# Patient Record
Sex: MALE | Race: WHITE | NOT HISPANIC OR LATINO | Employment: FULL TIME | URBAN - NONMETROPOLITAN AREA
[De-identification: names, ages, dates, MRNs, and addresses within clinical notes are randomized per-mention and may not be internally consistent; named-entity substitution may affect disease eponyms.]

---

## 2020-09-17 ENCOUNTER — OCCUPATIONAL MEDICINE (OUTPATIENT)
Dept: URGENT CARE | Facility: PHYSICIAN GROUP | Age: 55
End: 2020-09-17
Payer: COMMERCIAL

## 2020-09-17 ENCOUNTER — HOSPITAL ENCOUNTER (OUTPATIENT)
Facility: MEDICAL CENTER | Age: 55
End: 2020-09-17
Attending: FAMILY MEDICINE

## 2020-09-17 VITALS
HEIGHT: 67 IN | SYSTOLIC BLOOD PRESSURE: 150 MMHG | HEART RATE: 85 BPM | TEMPERATURE: 99.1 F | BODY MASS INDEX: 32.68 KG/M2 | OXYGEN SATURATION: 96 % | WEIGHT: 208.2 LBS | RESPIRATION RATE: 16 BRPM | DIASTOLIC BLOOD PRESSURE: 92 MMHG

## 2020-09-17 DIAGNOSIS — S80.211A ABRASION OF RIGHT KNEE, INITIAL ENCOUNTER: ICD-10-CM

## 2020-09-17 DIAGNOSIS — L02.91 ABSCESS: ICD-10-CM

## 2020-09-17 PROCEDURE — 87077 CULTURE AEROBIC IDENTIFY: CPT

## 2020-09-17 PROCEDURE — 10061 I&D ABSCESS COMP/MULTIPLE: CPT | Performed by: FAMILY MEDICINE

## 2020-09-17 PROCEDURE — 87186 SC STD MICRODIL/AGAR DIL: CPT

## 2020-09-17 PROCEDURE — 87070 CULTURE OTHR SPECIMN AEROBIC: CPT

## 2020-09-17 RX ORDER — AMOXICILLIN AND CLAVULANATE POTASSIUM 875; 125 MG/1; MG/1
1 TABLET, FILM COATED ORAL 2 TIMES DAILY
Qty: 10 TAB | Refills: 0 | Status: SHIPPED | OUTPATIENT
Start: 2020-09-17 | End: 2020-09-22

## 2020-09-17 RX ORDER — SULFAMETHOXAZOLE AND TRIMETHOPRIM 800; 160 MG/1; MG/1
1 TABLET ORAL EVERY 12 HOURS
Qty: 10 TAB | Refills: 0 | Status: SHIPPED | OUTPATIENT
Start: 2020-09-17 | End: 2020-09-22 | Stop reason: SDUPTHER

## 2020-09-17 ASSESSMENT — ENCOUNTER SYMPTOMS
BRUISES/BLEEDS EASILY: 0
FOCAL WEAKNESS: 0

## 2020-09-17 NOTE — PROGRESS NOTES
"Subjective:      Lucian Carmona is a 55 y.o. male who presents with Work-Related Injury ((R) knee, pt states a little pain, red, small lump)      DOI 9/2/2020  Abrasion to right medial knee due to fall while at work.  He has had subsequent expanding redness and warmth to the area.  Subjective fevers.  No red streaking.  No drainage.  Knee is moderately painful and stiff.  No prior injury or surgery.  No second job or outside activity contributing.  No other aggravating or alleviating factors.     HPI    Review of Systems   Skin: Negative for itching and rash.   Neurological: Negative for focal weakness.   Endo/Heme/Allergies: Does not bruise/bleed easily.          Objective:     /92   Pulse 85   Temp 37.3 °C (99.1 °F) (Temporal)   Resp 16   Ht 1.702 m (5' 7\")   Wt 94.4 kg (208 lb 3.2 oz)   SpO2 96%   BMI 32.61 kg/m²      Physical Exam    Right knee: Red tender swollen and fluctuant region distal medial aspect.  Expanding redness with a diameter of 15 cm.  No lymphangitis or proximal lymphadenopathy.  No point bony tenderness or deformity.    .  Procedure: Incision and Drainage  -Risks, benefits, and alternatives discussed. Risks include infection, bleeding, nerve damage, and poor cosmetic outcome  -Clean technique with sterile instruments  -Local anesthesia with 2% lidocaine with epinephrine  -Incision with #11 blade into fluctuant area with purulent material expressed  -Culture obtained and packaged for lab  -Cavity probed and any loculations bluntly taken down with hemostat  -Irrigated copiously with sterile saline  -Packed with 1/2\" gauze  -Minimal bleeding with good hemostasis achieved  -The patient tolerated the procedure well      Assessment/Plan:        1. Abscess    - CULTURE WOUND W/O GRAM STAIN; Future  - amoxicillin-clavulanate (AUGMENTIN) 875-125 MG Tab; Take 1 Tab by mouth 2 times a day for 5 days.  Dispense: 10 Tab; Refill: 0  - sulfamethoxazole-trimethoprim (BACTRIM DS) 800-160 " MG tablet; Take 1 Tab by mouth every 12 hours for 5 days.  Dispense: 10 Tab; Refill: 0    2. Abrasion of right knee, initial encounter    Differential diagnosis, natural history, supportive care, and indications for immediate follow-up discussed at length.     Wound check in 48 hours.  Work restrictions on [address].  Keep incision site clean and dry at work site.  I will follow up wound culture.

## 2020-09-17 NOTE — LETTER
"EMPLOYEE’S CLAIM FOR COMPENSATION/ REPORT OF INITIAL TREATMENT  FORM C-4    EMPLOYEE’S CLAIM - PROVIDE ALL INFORMATION REQUESTED   First Name  Lucian Last Name  Mathew Birthdate                    1965                Sex  male Claim Number   Home Address  [address]  Age  55 y.o. Height  1.702 m (5' 7\") Weight  94.4 kg (208 lb 3.2 oz) [facility]     [facility] Zip  [address] Telephone  [phone] (home)    Mailing Address  [address]  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 (Be specific an answer in detail. 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  N/A Witnesses to the Accident  None      Nature of Injury or Occupational Disease  Workers' Compensation  Part(s) of Body Injured or Affected  Knee (R), N/A, N/A    I certify that the above is true and correct to the best of my knowledge and that I have provided this information in order to obtain the benefits of [place] or [place]).  I hereby authorize any physician, chiropractor, surgeon, practitioner, or other person, any hospital, including [facility], any medical service organization, any insurance company, or other institution or organization to release to each other, any medical or other information, including benefits paid or payable, pertinent to this injury or disease, except information relative to diagnosis, treatment and/or counseling for AIDS, psychological conditions, alcohol or controlled substances, for which I must give specific authorization.  A Photostat of this authorization shall be as valid as the original.     Date   Place   Employee’s Signature   THIS REPORT MUST BE COMPLETED AND MAILED WITHIN 3 WORKING DAYS OF TREATMENT   Place  [facility]  Name of Facility  [place]   Date  9/17/2020 Diagnosis  (L02.91) Abscess  (S80.211A) Abrasion of right knee, initial encounter Is there evidence the injured employee was under the              influence of alcohol and/or another controlled substance at the time of accident?   Hour  12:34 PM Description of Injury or Disease  Diagnoses of Abscess and Abrasion of right knee, initial encounter were pertinent to this visit. No   Treatment  Incision and drainage of abscess.  Antibiotics.  Have you advised the patient to remain off work five days or     more? No   X-Ray Findings    Comments:NA   If Yes   From Date  To Date      From information given by the employee, together with medical evidence, can you directly connect this injury or occupational disease as job incurred?    Comments:Indeterminate.  Abscess possibly initiated by infected abrasion. If No Full Duty    No Modified Duty  Yes   Is additional medical care by a physician indicated?  Yes If Modified Duty, Specify any Limitations / Restrictions  Standing walking limited to 2 hours  Lifting and carrying weight limit 10 pounds  Keep incision site clean dry  "and covered   Do you know of any previous injury or disease contributing to this condition or occupational disease?                            No   Date  9/17/2020 Print Doctor’s Name   Thomas Jennings M.D. I certify the employer’s copy of  this form was mailed on:   Address  [address] Insurer’s Use Only     [facility] [facility] Zip  [address]    Provider’s Tax ID Number  [id number] Telephone  Dept: [phone]      jocelyne-THOMAS Matthews M.D.  Signature:     Degree          ORIGINAL-TREATING PHYSICIAN OR CHIROPRACTOR    PAGE 2-INSURER/TPA    PAGE 3-EMPLOYER    PAGE 4-EMPLOYEE        Form C-4 (rev.10/07)          BRIEF DESCRIPTION OF RIGHTS AND BENEFITS  (Pursuant to [place] 616C.050)    Notice of Injury or Occupational Disease (Incident Report Form C-1): If an injury or occupational disease (OD) arises out of and in the course of employment, you must provide written notice to your employer as soon as practicable, but no later than 7 days after the accident or OD. Your employer shall maintain a sufficient supply of the required forms.     Claim for Compensation (Form C-4): If medical treatment is sought, the form C-4 is available at the place of initial treatment. A completed \"Claim for Compensation\" (Form C-4) must be filed within 90 days after an accident or OD. The treating physician or chiropractor must, within 3 working days after treatment, complete and mail to the employer, the employer's insurer and third-party , the Claim for Compensation.     Medical Treatment: If you require medical treatment for your on-the-job injury or OD, you may be required to select a physician or chiropractor from a list provided by your workers’ compensation insurer, if it has contracted with an Organization for Managed Care (MCO) or Preferred Provider Organization (PPO) or providers of health care. If your employer has not entered into a contract with an MCO or PPO, you may select a physician or chiropractor " from the Panel of Physicians and Chiropractors. Any medical costs related to your industrial injury or OD will be paid by your insurer.     Temporary Total Disability (TTD): If your doctor has certified that you are unable to work for a period of at least 5 consecutive days, or 5 cumulative days in a 20-day period, or places restrictions on you that your employer does not accommodate, you may be entitled to TTD compensation.     Temporary Partial Disability (TPD): If the wage you receive upon reemployment is less than the compensation for TTD to which you are entitled, the insurer may be required to pay you TPD compensation to make up the difference. TPD can only be paid for a maximum of 24 months.     Permanent Partial Disability (PPD): When your medical condition is stable and there is an indication of a PPD as a result of your injury or OD, within 30 days, your insurer must arrange for an evaluation by a rating physician or chiropractor to determine the degree of your PPD. The amount of your PPD award depends on the date of injury, the results of the PPD evaluation and your age and wage.     Permanent Total Disability (PTD): If you are medically certified by a treating physician or chiropractor as permanently and totally disabled and have been granted a PTD status by your insurer, you are entitled to receive monthly benefits not to exceed 66 2/3% of your average monthly wage. The amount of your PTD payments is subject to reduction if you previously received a PPD award.     Vocational Rehabilitation Services: You may be eligible for vocational rehabilitation services if you are unable to return to the job due to a permanent physical impairment or permanent restrictions as a result of your injury or occupational disease.     Transportation and Per Justine Reimbursement: You may be eligible for travel expenses and per justine associated with medical treatment.     Reopening: You may be able to reopen your claim if your  condition worsens after claim closure.     Appeal Process: If you disagree with a written determination issued by the insurer or the insurer does not respond to your request, you may appeal to the Department of Administration, , by following the instructions contained in your determination letter. You must appeal the determination within 70 days from the date of the determination letter at [address], [address], [place], [place] [address], or [address] [facility], [address], [place], [place] [address]. If you disagree with the  decision, you may appeal to the Department of Administration, . You must file your appeal within 30 days from the date of the  decision letter at [address], [address], [place], [place] [address], or [address] [facility] [facility] [address], [place], [place] [address]. If you disagree with a decision of an , you may file a petition for judicial review with the District Court. You must do so within 30 days of the Appeal Officer’s decision. You may be represented by an  at your own expense or you may contact the [facility] for possible representation.     [place]  for Injured Workers (NAIW): If you disagree with a  decision, you may request that NAIW represent you without charge at an  Hearing. For information regarding denial of benefits, you may contact the [facility] at: [address], [address], [place] [address], [phone], or [address] [facility] [facility] [address], [place] [address], [phone]     To File a Complaint with the Division: If you wish to file a complaint with the  of the Division of Industrial Relations (DIR), please contact the Workers’ Compensation Section, [address] [facility] [facility] [address], [place], [place] [address], telephone [phone], or [address] [facility] [address], [place], [place] [address], telephone [phone].       For assistance with Workers’ Compensation Issues: You may contact the Office of the Governor Consumer Health Assistance, [address] [place] [address], [facility] [address], [place] [address], Toll Free 1-321.119.4502, Web site: http://[email or web address] access [email or web address]., E-mail [email or web address]              __________________________________________________________________                              ___________________         Employee Name / Signature                                                                                                                     Date                                                                                                                                                                                       D-2 (rev. 01/20)

## 2020-09-17 NOTE — LETTER
[address] [place] [address]  Phone:  [phone] - Fax:  [phone]   [facility] Progress Report and Disability Certification  Date of Service: 2020   No Show:  No  Date / Time of Next Visit: 2020   Claim Information   Patient Name: Lucian Carmona  Claim Number:     Employer:   [employer] Date of Injury: 2020     Insurer / TPA: Debra Zamora [facility]  ID / SSN:     Occupation: Farm Labor  Diagnosis: Diagnoses of Abscess and Abrasion of right knee, initial encounter were pertinent to this visit.    Medical Information   Related to Industrial Injury?   Comments:Indeterminate.  Abscess possibly initiated by infected abrasion.    Subjective Complaints:  DOI 2020  Abrasion to right medial knee due to fall while at work.  He has had subsequent expanding redness and warmth to the area.  Subjective fevers.  No red streaking.  No drainage.  Knee is moderately painful and stiff.  No prior injury or surgery.  No second job or outside activity contributing.  No other aggravating or alleviating factors.   Objective Findings: Right knee: Red tender swollen and fluctuant region distal medial aspect.  Expanding redness with a diameter of 15 cm.  No lymphangitis or proximal lymphadenopathy.  No point bony tenderness or deformity.   Pre-Existing Condition(s):     Assessment:   Initial Visit    Status: Additional Care Required  Permanent Disability:No    Plan:   Comments:Incision and drainage of abscess.  Antibiotics    Diagnostics:   Comments:culture pending    Comments:       Disability Information   Status: Released to Restricted Duty    From:  2020  Through: 2020 Restrictions are: Temporary   Physical Restrictions   Sitting:    Standing:  < or = to 2 hrs/day Stooping:    Bending:      Squattin hrs/day Walking:  < or = to 2 hrs/day Climbing:    Pushing:      Pulling:    Other:    Reaching Above Shoulder (L):    Reaching Above Shoulder (R):       Reaching Below Shoulder (L):    Reaching Below Shoulder (R):      Not to exceed Weight Limits   Carrying(hrs):   Weight Limit(lb): < or = to 10 pounds Lifting(hrs):   Weight  Limit(lb): < or = to 10 pounds   Comments: Keep incision site clean dry and covered while at work.    Repetitive Actions   Hands: i.e. Fine Manipulations from Grasping:     Feet: i.e. Operating Foot Controls:     Driving / Operate Machinery:     Provider Name:   Thomas Jennings M.D. Physician Signature:  Physician Name:     Clinic Name / Location: [address] [address] Clinic Phone Number: Dept: [phone]   Appointment Time: 12:10 Pm Visit Start Time: 12:34 PM   Check-In Time:  12:26 Pm Visit Discharge Time:  1:41pm   Original-Treating Physician or Chiropractor    Page 2-Insurer/TPA    Page 3-Employer    Page 4-Employee

## 2020-09-19 ENCOUNTER — OCCUPATIONAL MEDICINE (OUTPATIENT)
Dept: URGENT CARE | Facility: PHYSICIAN GROUP | Age: 55
End: 2020-09-19
Payer: COMMERCIAL

## 2020-09-19 VITALS
WEIGHT: 205.4 LBS | OXYGEN SATURATION: 95 % | RESPIRATION RATE: 16 BRPM | HEIGHT: 66 IN | TEMPERATURE: 99.4 F | SYSTOLIC BLOOD PRESSURE: 142 MMHG | BODY MASS INDEX: 33.01 KG/M2 | HEART RATE: 87 BPM | DIASTOLIC BLOOD PRESSURE: 98 MMHG

## 2020-09-19 DIAGNOSIS — S80.211A ABRASION OF RIGHT KNEE, INITIAL ENCOUNTER: ICD-10-CM

## 2020-09-19 DIAGNOSIS — L02.91 ABSCESS: ICD-10-CM

## 2020-09-19 LAB
BACTERIA WND AEROBE CULT: ABNORMAL
BACTERIA WND AEROBE CULT: ABNORMAL
SIGNIFICANT IND 70042: ABNORMAL
SITE SITE: ABNORMAL
SOURCE SOURCE: ABNORMAL

## 2020-09-19 PROCEDURE — 99024 POSTOP FOLLOW-UP VISIT: CPT | Mod: 29 | Performed by: NURSE PRACTITIONER

## 2020-09-19 NOTE — PROGRESS NOTES
"  Chief Complaint   Patient presents with   • Work-Related Injury     FV. Pt states that he feels better.        HISTORY OF PRESENT ILLNESS: Patient is a 55 y.o. male who presents to urgent care today with a work comp follow up. 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  He developed an abrasion and a subsequent abscess to his right knee.  He was seen in urgent care 2 days ago, and I indeed was performed, patient was placed on both Augmentin and Bactrim.  Today he reports significant improvement.  He denies any fever, chills, malaise.  He has been tolerating work restrictions well.  Preliminary culture report notes staph.      PMH: No pertinent past medical history to this problem  MEDS: Medications were reviewed in Epic  ALLERGIES: Allergies were reviewed in Epic  FH: No pertinent family history to this problem      ROS:  Review of Systems   Constitutional: Negative for fever, chills, weight loss, malaise, and fatigue.   HENT: Negative for ear pain, nosebleeds, congestion, sore throat and neck pain.    Eyes: Negative for vision changes.   Neuro: Negative for headache, sensory changes, weakness, seizure, LOC.   Cardiovascular: Negative for chest pain, palpitations, orthopnea and leg swelling.   Respiratory: Negative for cough, sputum production, shortness of breath and wheezing.   Gastrointestinal: Negative for abdominal pain, nausea, vomiting or diarrhea.   Genitourinary: Negative for dysuria, urgency and frequency.  Musculoskeletal: Negative for falls, neck pain, back pain, joint pain, myalgias.   Skin: Positive for abrasion and abscess to right knee.  Negative for rash, diaphoresis.     Exam:  /98   Pulse 87   Temp 37.4 °C (99.4 °F) (Temporal)   Resp 16   Ht 1.676 m (5' 6\")   Wt 93.2 kg (205 lb 6.4 oz)   SpO2 95%   General: well-nourished, well-developed male in NAD  Head: normocephalic, atraumatic  Eyes: PERRLA, no conjunctival injection, acuity grossly " intact, lids normal.  Ears: normal shape and symmetry, no tenderness, no discharge. External canals are without any significant edema or erythema. Tympanic membranes are without any inflammation, no effusion. Gross auditory acuity is intact.  Nose: symmetrical without tenderness, no discharge.  Mouth/Throat: reasonable hygiene, no erythema, exudates or tonsillar enlargement.  Neck: no masses, range of motion within normal limits, no tracheal deviation. No obvious thyroid enlargement.   Lymph: no cervical adenopathy. No supraclavicular adenopathy.   Neuro: alert and oriented. Cranial nerves 1-12 grossly intact. No sensory deficit.   Cardiovascular: regular rate and rhythm. No edema.  Pulmonary: no distress. Chest is symmetrical with respiration, no wheezes, crackles, or rhonchi.   Musculoskeletal: no clubbing, appropriate muscle tone.  Right knee: There is erythema, swelling, and tenderness to medial aspect with drained abscess and packing present.  Skin: warm, no clubbing, no cyanosis, no rashes. Right knee: There is erythema, swelling, and tenderness to medial aspect. Central is an incision with packing in place, draining serosanguinous fluid.  No lymphangitis.  No bony tenderness or deformity.  Psych: appropriate mood, affect, judgement.       Procedure: Repack of abscess  -Clean procedure  -Packing removed  -Irrigated copiously with NS  -Repacked with gauze  -Dressing applied  -Minimal bleeding with good hemostasis achieved  -The patient tolerated the procedure well      Assessment/Plan:  1. Abscess     2. Abrasion of right knee, initial encounter         Continue abx therapy, packing changed today, wound care discussed, work restrictions, RTC in 48 hours, motrin/tylenol for pain.   Supportive care, differential diagnoses, and indications for immediate follow-up discussed with patient.   Pathogenesis of diagnosis discussed including typical length and natural progression.   Instructed to return to clinic or  nearest emergency department sooner for any change in condition, further concerns, or worsening of symptoms.  Patient states understanding of the plan of care and discharge instructions.          Please note that this dictation was created using voice recognition software. I have made every reasonable attempt to correct obvious errors, but I expect that there are errors of grammar and possibly content that I did not discover before finalizing the note.      GEGE Nunez.

## 2020-09-19 NOTE — LETTER
[facility]  [address] [place] [address] [place] [address]  Phone:  [phone] - Fax:  [phone]   [facility] Progress Report and Disability Certification  Date of Service: 9/19/2020   No Show:  No  Date / Time of Next Visit: 9/21/2020   Claim Information   Patient Name: Lucian Carmona  Claim Number:     Employer:   [employer]  Date of Injury: 9/2/2020     Insurer / TPA: Debra Zamora [facility]  ID / SSN:     Occupation: Farm Labor  Diagnosis: Diagnoses of Abscess and Abrasion of right knee, initial encounter were pertinent to this visit.    Medical Information   Related to Industrial Injury? 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  He developed an abrasion and a subsequent abscess to his right knee.  He was seen in urgent care 2 days ago, and I indeed was performed, patient was placed on both Augmentin and Bactrim.  Today he reports significant improvement.  He denies any fever, chills, malaise.  He has been tolerating work restrictions well.  Preliminary culture report notes staph.   Objective Findings: A/Ox4. NAD. Right knee: There is erythema, swelling, and tenderness to medial aspect. Central is an incision with packing in place, draining serosanguinous fluid.  No lymphangitis.  No bony tenderness or deformity.   Pre-Existing Condition(s): Denies   Assessment:   Condition Improved    Status: Additional Care Required  Permanent Disability:No    Plan: Medication  Comments:Continue abx therapy, packing changed today, wound care discussed, work restrictions, RTC in 48 hours, motrin/tylenol for pain.     Diagnostics:   Comments:N/A    Comments:       Disability Information   Status: Released to Restricted Duty    From:  9/19/2020  Through: 9/21/2020 Restrictions are: Temporary   Physical Restrictions   Sitting:    Standing:  < or = to 2 hrs/day Stooping:    Bending:         Squatting:    Walking:  < or = to 2 hrs/day Climbin hrs/day Pushing:      Pulling:    Other:    Reaching Above Shoulder (L):   Reaching Above Shoulder (R):       Reaching Below Shoulder (L):    Reaching Below Shoulder (R):      Not to exceed Weight Limits   Carrying(hrs):   Weight Limit(lb): < or = to 10 pounds Lifting(hrs):   Weight  Limit(lb): < or = to 10 pounds   Comments:      Repetitive Actions   Hands: i.e. Fine Manipulations from Grasping:     Feet: i.e. Operating Foot Controls:     Driving / Operate Machinery:     Provider Name:   ED Nunez Physician Signature:  Physician Name:     Clinic Name / Location: [address] [address] Clinic Phone Number: Dept: [phone]   Appointment Time: 9:30 Am Visit Start Time: 9:17 AM   Check-In Time:  9:00 Am Visit Discharge Time:  10:06AM   Original-Treating Physician or Chiropractor    Page 2-Insurer/TPA    Page 3-Employer    Page 4-Employee

## 2020-09-22 ENCOUNTER — OCCUPATIONAL MEDICINE (OUTPATIENT)
Dept: URGENT CARE | Facility: PHYSICIAN GROUP | Age: 55
End: 2020-09-22
Payer: COMMERCIAL

## 2020-09-22 VITALS
HEIGHT: 66 IN | TEMPERATURE: 98 F | HEART RATE: 86 BPM | SYSTOLIC BLOOD PRESSURE: 170 MMHG | DIASTOLIC BLOOD PRESSURE: 100 MMHG | OXYGEN SATURATION: 95 % | WEIGHT: 208 LBS | BODY MASS INDEX: 33.43 KG/M2 | RESPIRATION RATE: 16 BRPM

## 2020-09-22 DIAGNOSIS — L02.91 ABSCESS: ICD-10-CM

## 2020-09-22 DIAGNOSIS — S80.211D ABRASION OF RIGHT KNEE, SUBSEQUENT ENCOUNTER: ICD-10-CM

## 2020-09-22 PROCEDURE — 99024 POSTOP FOLLOW-UP VISIT: CPT | Mod: 29 | Performed by: PHYSICIAN ASSISTANT

## 2020-09-22 RX ORDER — SULFAMETHOXAZOLE AND TRIMETHOPRIM 800; 160 MG/1; MG/1
1 TABLET ORAL EVERY 12 HOURS
Qty: 14 TAB | Refills: 0 | Status: SHIPPED | OUTPATIENT
Start: 2020-09-22 | End: 2020-09-29

## 2020-09-22 NOTE — PROGRESS NOTES
"Chief Complaint   Patient presents with   • Knee Injury     WC Fv, RT knee, Pt states he is improving.        HISTORY OF PRESENT ILLNESS: Patient is a 55 y.o. male who presents today for the following:    DOI: 9/2/2020, third evaluation  SKY: Fell while at work, causing an abrasion to the right knee which subsequently became infected.  I&D was performed and patient was put on Augmentin and Bactrim.  Patient stopped taking antibiotics after 2 days, indicating he was told to do so.  Patient does continue to have some soreness and discomfort with ambulation.  He denies fever.    Allergies:Patient has no known allergies.    PMH: No pertinent past medical history to this problem  MEDS: Medications were reviewed in Epic  ALLERGIES: Allergies were reviewed in Epic  SOCHX: Works for [employer]  FH: No pertinent family history to this problem    Review of Systems:    Constitutional ROS: No unexpected change in weight, No weakness, No fatigue  Skin/Integumentary ROS: Infection right knee    Exam:  BP (!) 170/100   Pulse 86   Temp 36.7 °C (98 °F) (Temporal)   Resp 16   Ht 1.676 m (5' 6\")   Wt 94.3 kg (208 lb)   SpO2 95%   General: Well developed, well nourished. No distress.  Pulmonary: Unlabored respiratory effort.   Neurologic: Grossly nonfocal. No facial asymmetry noted.  Extremities: Diffuse erythema is noted on the medial aspect of the right knee with packing still in place.  Small amount of packing was removed.  Serous drainage noted.  Continues to have mild tenderness in the area of erythema.  Diffuse mild edema is noted in the right lower leg extending into the foot.  Strong right pedal pulse.  Skin: Warm, dry, good turgor. No rashes in visible areas.   Psych: Normal mood. Alert and oriented x3. Judgment and insight is normal.    Assessment/Plan:  Concerned that patient needed to be on longer therapy than 2 days.  Suspect the decreased therapy was due to miscommunication.  Restarting antibiotics.  Continue " current restrictions through 9/27, full duty starting 9/28 with follow-up 10/5.     1. Abrasion of right knee, subsequent encounter     2. Abscess  sulfamethoxazole-trimethoprim (BACTRIM DS) 800-160 MG tablet

## 2020-09-22 NOTE — LETTER
[address] [place] [address]  Phone:  [phone] - Fax:  [phone]   [facility] Progress Report and Disability Certification  Date of Service: 9/22/2020   No Show:  No  Date / Time of Next Visit: 10/5/2020   Claim Information   Patient Name: Lucian Carmona  Claim Number:     Employer:   Date of Injury: 9/2/2020     Insurer / TPA: Debra Zamora [facility]  ID / SSN:     Occupation: [employer]  Diagnosis: Diagnoses of Abrasion of right knee, subsequent encounter and Abscess were pertinent to this visit.    Medical Information   Related to Industrial Injury?      Subjective Complaints:  DOI: 9/2/2020, third evaluation  SKY: Fell while at work, causing an abrasion to the right knee which subsequently became infected.  I&D was performed and patient was put on Augmentin and Bactrim.  Patient stopped taking antibiotics after 2 days, indicating he was told to do so.  Patient does continue to have some soreness and discomfort with ambulation.  He denies fever.   Objective Findings: Extremities: Diffuse erythema is noted on the medial aspect of the right knee with packing still in place.  Small amount of packing was removed.  Serous drainage noted.  Continues to have mild tenderness in the area of erythema.  Diffuse mild edema is noted in the right lower leg extending into the foot.  Strong right pedal pulse.   Pre-Existing Condition(s):     Assessment:   Condition Same    Status: Additional Care Required  Permanent Disability:     Plan: Medication    Diagnostics:      Comments:  Assessment/Plan:  Concerned that patient needed to be on longer therapy than 2 days.  Suspect the decreased therapy was due to miscommunication.  Restarting antibiotics.  Continue current restrictions through 9/27, full duty starting 9/28 with follow  -up 10/5.     1. Abrasion of right knee, subsequent encounter    2. Abscess  sulfamethoxazole-trimethoprim  (BACTRIM DS) 800-160 MG tablet        Disability Information   Status: Released to Restricted Duty    From:  9/22/2020  Through: 10/5/2020 Restrictions are: Temporary   Physical Restrictions   Sitting:    Standing:    Stooping:    Bending:      Squatting:    Walking:    Climbing:    Pushing:      Pulling:    Other:    Reaching Above Shoulder (L):   Reaching Above Shoulder (R):       Reaching Below Shoulder (L):    Reaching Below Shoulder (R):      Not to exceed Weight Limits   Carrying(hrs):   Weight Limit(lb):   Lifting(hrs):   Weight  Limit(lb):     Comments: Maintain current restrictions through 9/27, starting full duty 9/28    Repetitive Actions   Hands: i.e. Fine Manipulations from Grasping:     Feet: i.e. Operating Foot Controls:     Driving / Operate Machinery:     Provider Name:   Chiqui Jiang P.A.-C. Physician Signature:  Physician Name:     Clinic Name / Location: [address] [address] Clinic Phone Number: Dept: [phone]   Appointment Time: 9:00 Am Visit Start Time: 9:13 AM   Check-In Time:  8:44 Am Visit Discharge Time:  9:45 AM    Original-Treating Physician or Chiropractor    Page 2-Insurer/TPA    Page 3-Employer    Page 4-Employee

## 2020-10-05 ENCOUNTER — TELEPHONE (OUTPATIENT)
Dept: URGENT CARE | Facility: PHYSICIAN GROUP | Age: 55
End: 2020-10-05

## 2020-10-05 ENCOUNTER — OCCUPATIONAL MEDICINE (OUTPATIENT)
Dept: URGENT CARE | Facility: PHYSICIAN GROUP | Age: 55
End: 2020-10-05
Payer: COMMERCIAL

## 2020-10-05 VITALS
OXYGEN SATURATION: 98 % | BODY MASS INDEX: 33.43 KG/M2 | DIASTOLIC BLOOD PRESSURE: 100 MMHG | SYSTOLIC BLOOD PRESSURE: 144 MMHG | HEIGHT: 66 IN | WEIGHT: 208 LBS | TEMPERATURE: 98.6 F | HEART RATE: 82 BPM | RESPIRATION RATE: 14 BRPM

## 2020-10-05 DIAGNOSIS — I10 HYPERTENSION, UNSPECIFIED TYPE: ICD-10-CM

## 2020-10-05 DIAGNOSIS — S80.211D ABRASION OF RIGHT KNEE, SUBSEQUENT ENCOUNTER: ICD-10-CM

## 2020-10-05 PROCEDURE — 99213 OFFICE O/P EST LOW 20 MIN: CPT | Performed by: PHYSICIAN ASSISTANT

## 2020-10-05 NOTE — PROGRESS NOTES
"Chief Complaint   Patient presents with   • Knee Injury     RT knee injury       HISTORY OF PRESENT ILLNESS: Patient is a 55 y.o. male who presents today for the following:    DOI: 9/2/2020, 4th evaluation  SKY: Fell while at work, causing an abrasion to the right knee which subsequently became infected.  I&D was performed and patient was put on Augmentin and Bactrim.    Today patient is feeling much better describing only occasional soreness but denies pain.  He has full range of motion and is able to ambulate without issue.  Patient feels he is able to go back to work without restrictions.    Allergies:Patient has no known allergies.    PMH: No pertinent past medical history to this problem  MEDS: Medications were reviewed in Epic  ALLERGIES: Allergies were reviewed in Epic  SOCHX: Works for [employer]  FH: No pertinent family history to this problem    Review of Systems:    Constitutional ROS: No unexpected change in weight, No weakness, No fatigue  Skin/Integumentary ROS:  Denies right knee pain.    Exam:  /100   Pulse 82   Temp 37 °C (98.6 °F) (Temporal)   Resp 14   Ht 1.676 m (5' 6\")   Wt 94.3 kg (208 lb)   SpO2 98%   General: Well developed, well nourished. No distress.  Pulmonary: Unlabored respiratory effort.   Neurologic: Grossly nonfocal. No facial asymmetry noted.  Extremities: Mild hyperpigmentation is noted on the medial aspect of the right knee.  No warmth, tenderness, induration, or soft tissue swelling noted.  Full range of motion of the right knee.  No antalgic gait noted.  Skin: Warm, dry, good turgor. No rashes in visible areas.   Psych: Normal mood. Alert and oriented x3. Judgment and insight is normal.    Assessment/Plan:  Patient is all but back to baseline.  Discharge/MMI.  1. Abrasion of right knee, subsequent encounter         "

## 2020-10-05 NOTE — LETTER
[address] [place] [address]  Phone:  [phone] - Fax:  [phone]   [facility] Progress Report and Disability Certification  Date of Service: 10/5/2020   No Show:  No  Date / Time of Next Visit:     Claim Information   Patient Name: Lucian Carmona  Claim Number:     Employer:    Date of Injury: 9/2/2020     Insurer / TPA: Debra Zamora [facility]  ID / SSN:     Occupation: Farm [employer]  Diagnosis: The encounter diagnosis was Abrasion of right knee, subsequent encounter.    Medical Information   Related to Industrial Injury? Yes    Subjective Complaints:  DOI: 9/2/2020, 4th evaluation  SKY: Fell while at work, causing an abrasion to the right knee which subsequently became infected.  I&D was performed and patient was put on Augmentin and Bactrim.    Today patient is feeling much better describing only occasional soreness but denies pain.  He has full range of motion and is able to ambulate without issue.  Patient feels he is able to go back to work without restrictions.   Objective Findings: Extremities: Mild hyperpigmentation is noted on the medial aspect of the right knee.  No warmth, tenderness, induration, or soft tissue swelling noted.  Full range of motion of the right knee.  No antalgic gait noted.   Pre-Existing Condition(s):     Assessment:   Condition Improved    Status: Discharged /  MMI  Permanent Disability:No    Plan:      Diagnostics:      Comments:       Disability Information   Status: Released to Full Duty    From:  10/5/2020  Through:   Restrictions are:     Physical Restrictions   Sitting:    Standing:    Stooping:    Bending:      Squatting:    Walking:    Climbing:    Pushing:      Pulling:    Other:    Reaching Above Shoulder (L):   Reaching Above Shoulder (R):       Reaching Below Shoulder (L):    Reaching Below Shoulder (R):      Not to exceed Weight Limits   Carrying(hrs):   Weight Limit(lb):    Lifting(hrs):   Weight  Limit(lb):     Comments:      Repetitive Actions   Hands: i.e. Fine Manipulations from Grasping:     Feet: i.e. Operating Foot Controls:     Driving / Operate Machinery:     Provider Name:   Chiqui Jiang P.A.-C. Physician Signature:  Physician Name:     Clinic Name / Location: [address] [address] Clinic Phone Number: Dept: [phone]   Appointment Time: 9:40 Am Visit Start Time: 9:12 AM   Check-In Time:  9:08 Am Visit Discharge Time:  9:46am   Original-Treating Physician or Chiropractor    Page 2-Insurer/TPA    Page 3-Employer    Page 4-Employee